# Patient Record
Sex: MALE | URBAN - METROPOLITAN AREA
[De-identification: names, ages, dates, MRNs, and addresses within clinical notes are randomized per-mention and may not be internally consistent; named-entity substitution may affect disease eponyms.]

---

## 2024-05-23 ENCOUNTER — APPOINTMENT (OUTPATIENT)
Dept: URBAN - METROPOLITAN AREA CLINIC 209 | Age: 69
Setting detail: DERMATOLOGY
End: 2024-05-28

## 2024-05-23 DIAGNOSIS — L82.1 OTHER SEBORRHEIC KERATOSIS: ICD-10-CM

## 2024-05-23 DIAGNOSIS — L20.89 OTHER ATOPIC DERMATITIS: ICD-10-CM

## 2024-05-23 PROBLEM — L30.9 DERMATITIS, UNSPECIFIED: Status: ACTIVE | Noted: 2024-05-23

## 2024-05-23 PROCEDURE — OTHER PRESCRIPTION: OTHER

## 2024-05-23 PROCEDURE — OTHER MIPS QUALITY: OTHER

## 2024-05-23 PROCEDURE — OTHER COUNSELING: OTHER

## 2024-05-23 PROCEDURE — 99204 OFFICE O/P NEW MOD 45 MIN: CPT

## 2024-05-23 PROCEDURE — OTHER PRESCRIPTION MEDICATION MANAGEMENT: OTHER

## 2024-05-23 RX ORDER — HYDROCORTISONE 25 MG/G
CREAM TOPICAL
Qty: 30 | Refills: 2 | Status: ERX | COMMUNITY
Start: 2024-05-23

## 2024-05-23 RX ORDER — TRIAMCINOLONE ACETONIDE 1 MG/G
CREAM TOPICAL
Qty: 80 | Refills: 2 | Status: ERX

## 2024-05-23 ASSESSMENT — LOCATION ZONE DERM
LOCATION ZONE: GENITALIA
LOCATION ZONE: ARM
LOCATION ZONE: LEG
LOCATION ZONE: NECK
LOCATION ZONE: FACE

## 2024-05-23 ASSESSMENT — LOCATION SIMPLE DESCRIPTION DERM
LOCATION SIMPLE: LEFT THIGH
LOCATION SIMPLE: SCROTUM
LOCATION SIMPLE: RIGHT UPPER ARM
LOCATION SIMPLE: LEFT UPPER ARM
LOCATION SIMPLE: RIGHT THIGH
LOCATION SIMPLE: RIGHT FOREHEAD
LOCATION SIMPLE: NECK

## 2024-05-23 ASSESSMENT — LOCATION DETAILED DESCRIPTION DERM
LOCATION DETAILED: RIGHT ANTERIOR DISTAL THIGH
LOCATION DETAILED: RIGHT FOREHEAD
LOCATION DETAILED: LEFT ANTERIOR SCROTUM
LOCATION DETAILED: LEFT ANTERIOR PROXIMAL UPPER ARM
LOCATION DETAILED: LEFT SUPERIOR LATERAL NECK
LOCATION DETAILED: RIGHT ANTERIOR DISTAL UPPER ARM
LOCATION DETAILED: RIGHT SUPERIOR LATERAL NECK
LOCATION DETAILED: LEFT ANTERIOR PROXIMAL THIGH

## 2024-05-23 NOTE — PROCEDURE: PRESCRIPTION MEDICATION MANAGEMENT
Plan: .\\nDiscussed biopsy; patient defers at this time\\n\\nAdvised to use Dove sensitive skin (currently uses a Dove soap with scent)\\nCerave moisturizing cream \\nCerave itch-relief moisturizer prn itching (may refrigerate and apply cold)
Detail Level: Zone
Initiate Treatment: .\\nArms, legs, trunk:\\nTriamcinolone 0.1% cream - apply thin layer to spot treat affected areas BID up to 2 weeks prn itching/flare\\n\\nGenital area and behind ears:\\nHydrocortisone 2.5% cream - apply thin layer to affected areas BID up to 2 weeks prn itching/flare
Render In Strict Bullet Format?: Yes

## 2024-05-23 NOTE — PROCEDURE: MIPS QUALITY
Detail Level: Detailed
Quality 226: Preventive Care And Screening: Tobacco Use: Screening And Cessation Intervention: Patient screened for tobacco use and is an ex/non-smoker
Quality 47: Advance Care Plan: Advance Care Planning discussed and documented; advance care plan or surrogate decision maker documented in the medical record.
Name And Contact Information For Health Care Proxy: Lisa Griffiths ([phone])
Quality 431: Preventive Care And Screening: Unhealthy Alcohol Use - Screening: Patient not identified as an unhealthy alcohol user when screened for unhealthy alcohol use using a systematic screening method
Quality 130: Documentation Of Current Medications In The Medical Record: Current Medications Documented

## 2024-05-23 NOTE — HPI: RASH
How Severe Is Your Rash?: mild
Is This A New Presentation, Or A Follow-Up?: Rash
Additional History: .\\nPatient states that he traveled to [place] in June 2023 and while there, he started feeling itchy and rash developed. He was concerned about bed bugs in his room but his wife never developed rash. \\nRash began on thighs then chest.\\n\\nHe was treated with Permethrin 5% topical 8/2023. He can’t remember if he treated once or twice. \\n\\nNow, rash comes and goes. Present for a few weeks then gone for a few weeks.\\nPCP prescribed Triamcinolone and patient states that he probably uses it about 20 days per month but he is only using once daily.\\n\\nMost symptomatic areas are upper arms, chest, thighs; scrotum also itches at times.

## 2024-05-23 NOTE — PROCEDURE: COUNSELING
Cleanser Recommendations: CeraVe hydrating body cleanser\\nVanicream\\nFree & Clear
Moisturizer Recommendations: CeraVe moisturizing cream\\nVanicream\\nFree & Clear
Antihistamine Recommendations: OTC Claritin or Zyrtec QD-BID
Detail Level: Zone
Detail Level: Simple

## 2024-07-11 ENCOUNTER — APPOINTMENT (OUTPATIENT)
Dept: URBAN - METROPOLITAN AREA CLINIC 209 | Age: 69
Setting detail: DERMATOLOGY
End: 2024-07-13

## 2024-07-11 DIAGNOSIS — L20.89 OTHER ATOPIC DERMATITIS: ICD-10-CM

## 2024-07-11 PROBLEM — L30.9 DERMATITIS, UNSPECIFIED: Status: ACTIVE | Noted: 2024-07-11

## 2024-07-11 PROCEDURE — OTHER ORDER TESTS: OTHER

## 2024-07-11 PROCEDURE — OTHER MIPS QUALITY: OTHER

## 2024-07-11 PROCEDURE — OTHER COUNSELING: OTHER

## 2024-07-11 PROCEDURE — OTHER PRESCRIPTION MEDICATION MANAGEMENT: OTHER

## 2024-07-11 PROCEDURE — 99214 OFFICE O/P EST MOD 30 MIN: CPT

## 2024-07-11 ASSESSMENT — LOCATION ZONE DERM
LOCATION ZONE: GENITALIA
LOCATION ZONE: NECK
LOCATION ZONE: ARM
LOCATION ZONE: LEG

## 2024-07-11 ASSESSMENT — LOCATION SIMPLE DESCRIPTION DERM
LOCATION SIMPLE: RIGHT PRETIBIAL REGION
LOCATION SIMPLE: LEFT UPPER ARM
LOCATION SIMPLE: RIGHT UPPER ARM
LOCATION SIMPLE: LEFT PRETIBIAL REGION
LOCATION SIMPLE: NECK
LOCATION SIMPLE: SCROTUM

## 2024-07-11 ASSESSMENT — LOCATION DETAILED DESCRIPTION DERM
LOCATION DETAILED: RIGHT PROXIMAL PRETIBIAL REGION
LOCATION DETAILED: LEFT PROXIMAL PRETIBIAL REGION
LOCATION DETAILED: RIGHT ANTERIOR DISTAL UPPER ARM
LOCATION DETAILED: LEFT ANTERIOR SCROTUM
LOCATION DETAILED: LEFT SUPERIOR LATERAL NECK
LOCATION DETAILED: RIGHT ANTERIOR SCROTUM
LOCATION DETAILED: RIGHT SUPERIOR LATERAL NECK
LOCATION DETAILED: LEFT ANTERIOR PROXIMAL UPPER ARM

## 2024-07-11 NOTE — PROCEDURE: MIPS QUALITY
Detail Level: Detailed
Quality 226: Preventive Care And Screening: Tobacco Use: Screening And Cessation Intervention: Patient screened for tobacco use and is an ex/non-smoker
Quality 47: Advance Care Plan: Advance Care Planning discussed and documented; advance care plan or surrogate decision maker documented in the medical record.
Name And Contact Information For Health Care Proxy: Lisa Griffiths ([phone])
Quality 431: Preventive Care And Screening: Unhealthy Alcohol Use - Screening: Patient not identified as an unhealthy alcohol user when screened for unhealthy alcohol use using a systematic screening method
Quality 130: Documentation Of Current Medications In The Medical Record: Current Medications Documented

## 2024-07-11 NOTE — PROCEDURE: PRESCRIPTION MEDICATION MANAGEMENT
Plan: .\\nContinue CeraVe moisturizing cream\\nEncourage patient to also moisturize legs and trunk (he was only using on arms)\\n\\nCeraVe itch-relief moisturizer prn (may refrigerate and apply cold)\\n\\nDue to persistent itching of and around genital area, patient given lab req. to check the following labs:\\nCBC w/diff w/plt, CMP, Ferritin, Fe + TIBC, TSH + Free T4, HBV/HCV
Detail Level: Zone
Samples Given: .\\nCeraVe itch-relief moisturizer x2
Continue Regimen: .\\nOnly prn flare:\\nArms, legs, trunk:\\nTriamcinolone 0.1% cream - apply thin layer to spot treat affected areas BID up to 2 weeks prn itching/flare\\n\\nGenital area and behind ears:\\nHydrocortisone 2.5% cream - apply thin layer to affected areas BID up to 2 weeks prn itching/flare\\n\\nReviewed appropriate use of topical steroids and risk of steroid atrophy
Render In Strict Bullet Format?: Yes

## 2024-07-11 NOTE — PROCEDURE: COUNSELING
Cleanser Recommendations: CeraVe hydrating body cleanser\\nVanicream\\nFree & Clear
Moisturizer Recommendations: CeraVe moisturizing cream\\nVanicream\\nFree & Clear
Antihistamine Recommendations: OTC Claritin or Zyrtec QD-BID
Detail Level: Zone

## 2024-07-11 NOTE — PROCEDURE: ORDER TESTS
Billing Type: Third-Party Bill
Performing Laboratory: -[id number]
Expected Date Of Service: 07/11/2024
Bill For Surgical Tray: no